# Patient Record
(demographics unavailable — no encounter records)

---

## 2024-12-19 NOTE — DISCUSSION/SUMMARY
[EKG obtained to assist in diagnosis and management of assessed problem(s)] : EKG obtained to assist in diagnosis and management of assessed problem(s) [FreeTextEntry1] : 1) Syncope - Inpatient head CT, carotid US, TTE w/ bubble, ECG and telemetry did not show etiology of syncopal episode, high suspicion for arrhythmic etiology - Plan for implantable loop monitor  2) HTN BP mildly elevated at 136/76mmHg today, better controlled than prior - Continue losartan 50mg qD  3) HLD lipid panel 12/5/24: , HDL 79, , TG 99 Continue atorvastatin 40mg qHS Patient reports prior cardiac catheterizations at OSH including [place], reports not available currently.

## 2024-12-19 NOTE — HISTORY OF PRESENT ILLNESS
[FreeTextEntry1] : 72 year old female with PMH of HLD, HTN osteoarthritis, melanoma s/p resection and recent hospitalization for hypertensive urgency and syncope who presents for outpatient follow up.    Patient states that she was in her usual state of health on the day of presentation (12/4/24), when she stood up from her dining room table and while walking around the table had sudden onset lightheadedness without prodrome, followed by a loss of consciousness where she hit her head and nose on the table. Unclear duration of LOC however her  had left the room to use the bathroom just prior to the incident (did not witness the episode), and he was still in the bathroom when patient regained consciousness. Patient awoke on the floor, bleeding from her nose/head and was able to lift herself into a nearby chair.  No postictal symptoms or confusion.  She was brought to the urgent care by her  and was then brought to the [facility] ED for further evaluation.  During that admission, patient had unremarkable Head CT, TTE with normal LVEF, mild TR/AL with negative bubble study, Carotid US with mild L carotid bulb plaque without significant stenosis, and no significant arrhythmias noted on telemetry or ECG.  Patient's BP was elevated upon arrival to the ED at 225/112 mmHg with HR 90bpm, patient had not previously taken medications.  Patient was started on losartan 50mg qD and atorvastatin 40mg qHS prior to discharge, no etiology of syncope was identified.  Of note, patient was previously followed by a cardiologist years ago, states that she had prior cardiac catheterizations with no significant findings at OSH, one catheterization was performed at [facility].  Prior studies TTE: 12/5/2024: LVEF 60-65%, indeterminate LV diastolic function, mild TR, mild AL, mildly dilated LA, negative bubble study for intracardiac shunt  Carotid US 12/5/2024: mild plaque in L carotid bulb with no significant stenosis bilaterally  ECG 12/4/0224: NSR possible RVH, possible LAE, nonspecific ST abnormality Labs: 12/5/2024: Lipid Panel: , HDL 79, , TG 99; BUN/Cr 21/1.0, TSH 1.93, hs-trop 18, Hgb/Hct 14.4/37.3; HgbA1c 5.8%

## 2024-12-19 NOTE — PHYSICAL EXAM
[Well Developed] : well developed [Well Nourished] : well nourished [No Acute Distress] : no acute distress [Normal Conjunctiva] : normal conjunctiva [Normal Venous Pressure] : normal venous pressure [No Carotid Bruit] : no carotid bruit [Clear Lung Fields] : clear lung fields [Good Air Entry] : good air entry [No Respiratory Distress] : no respiratory distress  [Soft] : abdomen soft [Non Tender] : non-tender [Normal Gait] : normal gait [No Edema] : no edema [Moves all extremities] : moves all extremities [No Focal Deficits] : no focal deficits [Normal Speech] : normal speech [Alert and Oriented] : alert and oriented [Normal memory] : normal memory [de-identified] : RRR, S1 and S2, no significant MRG

## 2024-12-19 NOTE — PHYSICAL EXAM
[Well Developed] : well developed [Well Nourished] : well nourished [No Acute Distress] : no acute distress [Normal Conjunctiva] : normal conjunctiva [Normal Venous Pressure] : normal venous pressure [No Carotid Bruit] : no carotid bruit [Clear Lung Fields] : clear lung fields [Good Air Entry] : good air entry [No Respiratory Distress] : no respiratory distress  [Soft] : abdomen soft [Non Tender] : non-tender [Normal Gait] : normal gait [No Edema] : no edema [Moves all extremities] : moves all extremities [No Focal Deficits] : no focal deficits [Normal Speech] : normal speech [Alert and Oriented] : alert and oriented [Normal memory] : normal memory [de-identified] : RRR, S1 and S2, no significant MRG

## 2024-12-19 NOTE — ASSESSMENT
[FreeTextEntry1] : 72 year old female with PMH of HLD, HTN osteoarthritis, melanoma s/p resection and recent hospitalization for hypertensive urgency and syncope who presents for outpatient follow up. CT head, TTE, Carotid US, ECG and telemetry while inpatient did not reveal etiology of syncopal episode.  Per patient's description of sudden onset syncope, subsequent head-strike, and no post-ictal period, will evaluate for possible arrhythmogenic etiology of syncope.  Plan for implantable loop recorder for further evaluation. BP better controlled during this visit, will continue current regimen of losartan and atorvastatin.

## 2024-12-19 NOTE — REVIEW OF SYSTEMS
[Negative] : Psychiatric [FreeTextEntry2] : Intermittent lightheadedness when bending over [FreeTextEntry9] : Chronic b/l knee pain [de-identified] : As Per HPI

## 2024-12-19 NOTE — DISCUSSION/SUMMARY
[EKG obtained to assist in diagnosis and management of assessed problem(s)] : EKG obtained to assist in diagnosis and management of assessed problem(s) [FreeTextEntry1] : 1) Syncope - Inpatient head CT, carotid US, TTE w/ bubble, ECG and telemetry did not show etiology of syncopal episode, high suspicion for arrhythmic etiology - Plan for implantable loop monitor  2) HTN BP mildly elevated at 136/76mmHg today, better controlled than prior - Continue losartan 50mg qD  3) HLD lipid panel 12/5/24: , HDL 79, , TG 99 Continue atorvastatin 40mg qHS Patient reports prior cardiac catheterizations at OSH including [place], reports not available currently.

## 2024-12-19 NOTE — REVIEW OF SYSTEMS
[Negative] : Psychiatric [FreeTextEntry2] : Intermittent lightheadedness when bending over [FreeTextEntry9] : Chronic b/l knee pain [de-identified] : As Per HPI

## 2025-01-09 NOTE — PROCEDURE
[de-identified] : [employer] [de-identified] : Linq II [de-identified] : [id number] [de-identified] : 12/30/24 [de-identified] :  Initial interrogation of loop recorder.  No arrhythmia noted.  Wound CDI.   Explained use of symptom activator, how transmissions are sent and how the monitoring is billed.  DRC monthly.  Yearly in office DVC.  F/U with cardiologist as scheduled.

## 2025-01-16 NOTE — HISTORY OF PRESENT ILLNESS
[FreeTextEntry1] : 72 year old female with PMH of HLD, HTN osteoarthritis, melanoma s/p resection and recent hospitalization for hypertensive urgency and syncope who presents for follow up. S/p implantable loop recorder 12/30/2024, well healed.  Patient has not had any additional episodes of syncope or presyncope since ILR implantation. She is compliant with her medications which includes losartan 50mg qD and atorvastatin 40mg qHS, she takes both medications at night.  She states that she checks her home BP intermittently and states that it is usually in the normal range but occasionally the systolic can be high, which she describes as 150-170mmHg.    Patient also mentions that she has infrequent (1x per month) episodes of nonexertional, atypical epigastric chest discomfort which resolves spontaneously after a few minutes. She also notes symptoms of mild dyspnea when she "rushes" doing her daily activities, symptoms occur approx 1-2 times per month and are not associated with the chest discomfort.  Symptoms have been present for the past 5-6 months. Of note, patient reports that she had previously underwent ischemic evaluation >12 years ago at OSH with multiple stress tests and underwent 2 diagnostic LHC (patient does not remember the indications).  Prior studies TTE: 12/5/2024: LVEF 60-65%, indeterminate LV diastolic function, mild TR, mild PA, mildly dilated LA, negative bubble study for intracardiac shunt  Carotid US 12/5/2024: mild plaque in L carotid bulb with no significant stenosis bilaterally  ECG 12/4/0224: NSR possible RVH, possible LAE, nonspecific ST abnormality Labs: 12/5/2024: Lipid Panel: , HDL 79, , TG 99; BUN/Cr 21/1.0, TSH 1.93, hs-trop 18, Hgb/Hct 14.4/37.3; HgbA1c 5.8%

## 2025-01-16 NOTE — PHYSICAL EXAM
[Well Developed] : well developed [Well Nourished] : well nourished [No Acute Distress] : no acute distress [Normal Conjunctiva] : normal conjunctiva [Normal Venous Pressure] : normal venous pressure [No Carotid Bruit] : no carotid bruit [Clear Lung Fields] : clear lung fields [Good Air Entry] : good air entry [No Respiratory Distress] : no respiratory distress  [Soft] : abdomen soft [Non Tender] : non-tender [Normal Gait] : normal gait [No Edema] : no edema [Moves all extremities] : moves all extremities [No Focal Deficits] : no focal deficits [Normal Speech] : normal speech [Alert and Oriented] : alert and oriented [Normal memory] : normal memory [de-identified] : RRR, S1 and S2, no significant MRG

## 2025-01-16 NOTE — PHYSICAL EXAM
[Well Developed] : well developed [Well Nourished] : well nourished [No Acute Distress] : no acute distress [Normal Conjunctiva] : normal conjunctiva [Normal Venous Pressure] : normal venous pressure [No Carotid Bruit] : no carotid bruit [Clear Lung Fields] : clear lung fields [Good Air Entry] : good air entry [No Respiratory Distress] : no respiratory distress  [Soft] : abdomen soft [Non Tender] : non-tender [Normal Gait] : normal gait [No Edema] : no edema [Moves all extremities] : moves all extremities [No Focal Deficits] : no focal deficits [Normal Speech] : normal speech [Alert and Oriented] : alert and oriented [Normal memory] : normal memory [de-identified] : RRR, S1 and S2, no significant MRG

## 2025-01-16 NOTE — DISCUSSION/SUMMARY
[FreeTextEntry1] : 1) Syncope - Inpatient head CT, carotid US, TTE w/ bubble, ECG and telemetry did not show etiology of syncopal episode - No further episodes since hospitalization - s/p ILR on 12/30/2024, no arrhythmic episodes seen on monitoring thus far  2) Atypical chest pain - Infrequent epigastric discomfort, nonexertional, occurs approx once per month with spontaneous resolution - Remote history of multiple stress tests and diagnostic LHC x 2 with no PCI at OSH >12 years prior - Discussed possibility of pharmacologic nuclear stress test for further evaluation, patient would like to defer at present and will reconsider on RTC  3) Exertional Dyspnea - Infrequent episodes of CORMIER when "rushing" with her daily activities for approximately 5-6months, occurring at a frequency of once per month with spontaneous resolution. Symptoms not associated with chest pain. - TTE with no significant cardiomyopathy or valvulopathy - Discussed pharm nuclear stress test as above, patient would like to defer for now - Reassess symptoms on RTC  4) HTN - Hypertensive today 150s-160s mmHg systolic BP. Patient reports occasional home BP monitoring, states that it is usually normal but with episodes of elevated BP in the 170s mmHg - Continue losartan 50mg qD, patient had been taking in evening, will change to morning - Recommended patient monitor ambulatory BP daily and to maintain BP log to be brought to her upcoming PCP appointment in 8 days.  - If office visit and home BP log remain elevated, would consider adding CCB to current regimen.  5) HLD lipid panel 12/5/24: , HDL 79, , TG 99 Continue atorvastatin 40mg qHS.  - Restart ASA 81mg  6) Mild Carotid Atherosclerosis - Seen on Carotid US 12/5/24: mild plaque in L carotid bulb without significant stenosis - Continue atorvastatin 40mg qHS and start ASA 81mg qD as above

## 2025-01-16 NOTE — HISTORY OF PRESENT ILLNESS
[FreeTextEntry1] : 72 year old female with PMH of HLD, HTN osteoarthritis, melanoma s/p resection and recent hospitalization for hypertensive urgency and syncope who presents for follow up. S/p implantable loop recorder 12/30/2024, well healed.  Patient has not had any additional episodes of syncope or presyncope since ILR implantation. She is compliant with her medications which includes losartan 50mg qD and atorvastatin 40mg qHS, she takes both medications at night.  She states that she checks her home BP intermittently and states that it is usually in the normal range but occasionally the systolic can be high, which she describes as 150-170mmHg.    Patient also mentions that she has infrequent (1x per month) episodes of nonexertional, atypical epigastric chest discomfort which resolves spontaneously after a few minutes. She also notes symptoms of mild dyspnea when she "rushes" doing her daily activities, symptoms occur approx 1-2 times per month and are not associated with the chest discomfort.  Symptoms have been present for the past 5-6 months. Of note, patient reports that she had previously underwent ischemic evaluation >12 years ago at OSH with multiple stress tests and underwent 2 diagnostic LHC (patient does not remember the indications).  Prior studies TTE: 12/5/2024: LVEF 60-65%, indeterminate LV diastolic function, mild TR, mild CA, mildly dilated LA, negative bubble study for intracardiac shunt  Carotid US 12/5/2024: mild plaque in L carotid bulb with no significant stenosis bilaterally  ECG 12/4/0224: NSR possible RVH, possible LAE, nonspecific ST abnormality Labs: 12/5/2024: Lipid Panel: , HDL 79, , TG 99; BUN/Cr 21/1.0, TSH 1.93, hs-trop 18, Hgb/Hct 14.4/37.3; HgbA1c 5.8%

## 2025-01-16 NOTE — REVIEW OF SYSTEMS
[FreeTextEntry2] : Intermittent lightheadedness when bending over [de-identified] : As Per HPI [Negative] : Neurological [FreeTextEntry4] : Infrequent R ear pain [FreeTextEntry5] : As Per HPI  [FreeTextEntry6] : As Per HPI  [FreeTextEntry9] : Chronic b/l knee pain

## 2025-01-16 NOTE — ASSESSMENT
[FreeTextEntry1] : 72 year old female with PMH of HLD, HTN osteoarthritis, melanoma s/p resection and recent hospitalization for hypertensive urgency and syncope who presents for follow up. S/p implantable loop recorder 12/30/2024.  Patient with no further episodes of syncope or presyncope.  BP elevated at visit today with SBP 150s-160s mmHg, patient takes losartan 50mg qHS.  Monitors home BP intermittently, reports typically normal range BP readings but will occasionally have elevated BP in the 170smmHg. Patient will start taking losartan 50mg in the morning, and will check daily ambulatory BP readings and record in BP log, which she will bring with her to her next PCP appointment on 1/24/25.  If BP in office and home BP log remains elevated, would consider adding CCB or thiazide to current regimen.    Patient also reports infrequent atypical nonexertional epigastric pain, and separate symptoms of infrequent exertional dyspnea when "rushing" through her daily activities.  Both symptoms appear to have started 5-6 months prior.  Patient suffers from bilateral knee arthritic pain and would be unable to walk on treadmill.  Pharmacologic nuclear stress test was suggested to the patient at this time, however patient would like to defer at this time, will re-evaluate on RTC.  Start ASA 81mg qD and continue atorvastatin 40mg qHS.

## 2025-01-16 NOTE — REVIEW OF SYSTEMS
[FreeTextEntry2] : Intermittent lightheadedness when bending over [de-identified] : As Per HPI [Negative] : Neurological [FreeTextEntry4] : Infrequent R ear pain [FreeTextEntry5] : As Per HPI  [FreeTextEntry6] : As Per HPI  [FreeTextEntry9] : Chronic b/l knee pain

## 2025-04-15 NOTE — DISCUSSION/SUMMARY
[FreeTextEntry1] : 1) Syncope - Inpatient head CT, carotid US, TTE w/ bubble, ECG and telemetry did not show etiology of syncopal episode - No further episodes since hospitalization - s/p ILR on 12/30/2024, no arrhythmic episodes seen on monitoring thus far  2) Atypical chest pain - improved - Infrequent epigastric discomfort, nonexertional, occurs approx once per month with spontaneous resolution - Remote history of multiple stress tests and diagnostic LHC x 2 with no PCI at OSH >12 years prior - Discussed possibility of pharmacologic nuclear stress test for further evaluation, patient again wishes to defer, re-evaluate on RTC  3) Exertional Dyspnea - improved - TTE with no significant cardiomyopathy or valvulopathy - Discussed pharm nuclear stress test as above, patient would like to defer for now - Reassess symptoms on RTC  4) HTN - Hypertensive at office visit again today, home BP log shows consistent normotensive measurements. - Continue losartan 50mg qD - Patient will follow up with PCP and will bring home BP cuff to compare measurements - If office visit and home BP log remain elevated, would consider adding CCB or thiazide to current regimen.  5) HLD lipid panel 12/5/24: , HDL 79, , TG 99 Continue atorvastatin 40mg qHS.  - ASA 81mg  6) Mild Carotid Atherosclerosis - Seen on Carotid US 12/5/24: mild plaque in L carotid bulb without significant stenosis - Continue atorvastatin 40mg qHS and start ASA 81mg qD as above [EKG obtained to assist in diagnosis and management of assessed problem(s)] : EKG obtained to assist in diagnosis and management of assessed problem(s)

## 2025-04-15 NOTE — HISTORY OF PRESENT ILLNESS
[FreeTextEntry1] : 72 year old female with PMH of HLD, HTN osteoarthritis, melanoma s/p resection and recent hospitalization for hypertensive urgency and syncope who presents for follow up. S/p implantable loop recorder 12/30/2024.  Patient states that she has no acute complaints, reports improvement in her atypical chest pain, and improvement in her exertional dyspnea.  While office BP was elevated today (162/98mmHg), patient brought her home BP log to this visit which showed consistent normotensive readings over the past 3 months with BPs in the 120s/70s-80s.  Patient reports compliance with all of her medications. She does note that her  has health issues currently and many doctor visits and testing, causing them to have a significant amount of medical bills at this time.   Prior studies TTE: 12/5/2024: LVEF 60-65%, indeterminate LV diastolic function, mild TR, mild MI, mildly dilated LA, negative bubble study for intracardiac shunt  Carotid US 12/5/2024: mild plaque in L carotid bulb with no significant stenosis bilaterally  ECG 12/4/0224: NSR possible RVH, possible LAE, nonspecific ST abnormality Labs: 12/5/2024: Lipid Panel: , HDL 79, , TG 99; BUN/Cr 21/1.0, TSH 1.93, hs-trop 18, Hgb/Hct 14.4/37.3; HgbA1c 5.8%

## 2025-04-15 NOTE — HISTORY OF PRESENT ILLNESS
[FreeTextEntry1] : 72 year old female with PMH of HLD, HTN osteoarthritis, melanoma s/p resection and recent hospitalization for hypertensive urgency and syncope who presents for follow up. S/p implantable loop recorder 12/30/2024.  Patient states that she has no acute complaints, reports improvement in her atypical chest pain, and improvement in her exertional dyspnea.  While office BP was elevated today (162/98mmHg), patient brought her home BP log to this visit which showed consistent normotensive readings over the past 3 months with BPs in the 120s/70s-80s.  Patient reports compliance with all of her medications. She does note that her  has health issues currently and many doctor visits and testing, causing them to have a significant amount of medical bills at this time.   Prior studies TTE: 12/5/2024: LVEF 60-65%, indeterminate LV diastolic function, mild TR, mild ME, mildly dilated LA, negative bubble study for intracardiac shunt  Carotid US 12/5/2024: mild plaque in L carotid bulb with no significant stenosis bilaterally  ECG 12/4/0224: NSR possible RVH, possible LAE, nonspecific ST abnormality Labs: 12/5/2024: Lipid Panel: , HDL 79, , TG 99; BUN/Cr 21/1.0, TSH 1.93, hs-trop 18, Hgb/Hct 14.4/37.3; HgbA1c 5.8%

## 2025-04-15 NOTE — ASSESSMENT
[FreeTextEntry1] : 72 year old female with PMH of HLD, HTN osteoarthritis, melanoma s/p resection and recent hospitalization for hypertensive urgency and syncope who presents for follow up. S/p implantable loop recorder 12/30/2024. Patient will elevated BP on visit today but on review of home BP measurements over the prior 2-3 months, BP has been well controlled in the 120s/70s-80s, infrequent elevated measurements to 150mmHg SBP.  Will continue current losartan regimen, patient will follow up with PCP and bring BP cuff to that visit to compare with office BP.  Would consider addition of thiazide or CCB if measurements of home BP cuff are discordant. Patient reports improvement with her exertional dyspnea and atypical chest pain, would like to defer additional evaulation at this time.  ECG obtained today with SR, iRBBB, nonspecific T wave changes

## 2025-04-15 NOTE — PHYSICAL EXAM
[Well Developed] : well developed [Well Nourished] : well nourished [No Acute Distress] : no acute distress [Normal Conjunctiva] : normal conjunctiva [Normal Venous Pressure] : normal venous pressure [No Carotid Bruit] : no carotid bruit [Clear Lung Fields] : clear lung fields [Good Air Entry] : good air entry [No Respiratory Distress] : no respiratory distress  [Soft] : abdomen soft [Non Tender] : non-tender [Normal Gait] : normal gait [No Edema] : no edema [Moves all extremities] : moves all extremities [No Focal Deficits] : no focal deficits [Normal Speech] : normal speech [Alert and Oriented] : alert and oriented [Normal memory] : normal memory [de-identified] : RRR, S1 and S2, no significant MRG [de-identified] : Trace LE edema, varicose veins

## 2025-04-15 NOTE — REVIEW OF SYSTEMS
[FreeTextEntry4] : Infrequent R ear pain [Negative] : ENT [FreeTextEntry5] : As Per HPI  [FreeTextEntry6] : As Per HPI  [FreeTextEntry9] : Chronic b/l knee pain

## 2025-04-15 NOTE — PHYSICAL EXAM
[Well Developed] : well developed [Well Nourished] : well nourished [No Acute Distress] : no acute distress [Normal Conjunctiva] : normal conjunctiva [Normal Venous Pressure] : normal venous pressure [No Carotid Bruit] : no carotid bruit [Clear Lung Fields] : clear lung fields [Good Air Entry] : good air entry [No Respiratory Distress] : no respiratory distress  [Soft] : abdomen soft [Non Tender] : non-tender [Normal Gait] : normal gait [No Edema] : no edema [Moves all extremities] : moves all extremities [No Focal Deficits] : no focal deficits [Normal Speech] : normal speech [Alert and Oriented] : alert and oriented [Normal memory] : normal memory [de-identified] : RRR, S1 and S2, no significant MRG [de-identified] : Trace LE edema, varicose veins